# Patient Record
Sex: FEMALE | Race: WHITE
[De-identification: names, ages, dates, MRNs, and addresses within clinical notes are randomized per-mention and may not be internally consistent; named-entity substitution may affect disease eponyms.]

---

## 2018-10-26 ENCOUNTER — HOSPITAL ENCOUNTER (OUTPATIENT)
Dept: HOSPITAL 47 - RADMAMWWP | Age: 48
Discharge: HOME | End: 2018-10-26
Attending: INTERNAL MEDICINE
Payer: COMMERCIAL

## 2018-10-26 DIAGNOSIS — R07.9: ICD-10-CM

## 2018-10-26 DIAGNOSIS — Z12.31: Primary | ICD-10-CM

## 2018-10-26 PROCEDURE — 75571 CT HRT W/O DYE W/CA TEST: CPT

## 2018-10-26 PROCEDURE — 77067 SCR MAMMO BI INCL CAD: CPT

## 2018-10-26 PROCEDURE — 77063 BREAST TOMOSYNTHESIS BI: CPT

## 2018-10-26 NOTE — CT
EXAMINATION TYPE: CT heart w calcium score

 

DATE OF EXAM: 10/26/2018

 

COMPARISON: None

 

HISTORY: Screening for cardiovascular disorder. 213.9

 

CT DLP: 44.8 mGycm

Automated exposure control for dose reduction was used.

 

CT CALCIUM SCORING

Coronary calcium is a marker for plaque (fatty deposits) in a blood vessel or atherosclerosis (harden
ing of the arteries).  The presence and amount of calcium detected in a coronary artery by the CT sca
n, indicates the presence and amount of atherosclerotic plaque.  These calcium deposits appear years 
before the development of heart disease symptoms such as chest pain and shortness of breath.

 

A calcium score is computed for each of the coronary arteries based upon the volume and density of th
e calcium deposits.  This can be referred to as your calcified plaque burden.  It does not correspond
 directly to the percentage of narrowing in the artery but does correlate with the severity of the un
derlying coronary atherosclerosis.

 

PROCEDURE

 

TECHNIQUE - Prospective Gating was used.  Slice thickness: 3mm.

Density threshold (HU): 130, Pixel threshold: 3, Algorithm: discrete.

 

RESULTS

 

Region:  LM

Calcium Score (Agatston): 0

Volume (mm3): 0

Mass (g):

 

Region:  RCA

Calcium Score (Agatston): 0

Volume (mm3): 0

Mass (g):

 

Region:  LAD

Calcium Score (Agatston): 0

Volume (mm3): 0

Mass (g):

 

Region:  CX

Calcium Score (Agatston): 0

Volume (mm3): 0

Mass (g):

 

Total:

Calcium Score (Agatston):  0

Volume (mm3): 0

Mass (g):

 

TOTAL CALCIUM SCORE:  0

 

IMPRESSION: 

 

Calcium Score:  0

 

Implication:  No identifiable plaque.

 

Risk of Coronary Artery Disease: Very low, generally less than 5%.

## 2018-10-29 NOTE — MM
Reason for exam: screening  (asymptomatic).

Last mammogram was performed 2 years and 9 months ago.



History:

Benign US left guided VAD of the left breast, March 21, 2011.

Taking hormonal contraceptives for 7 years 9 months beginning at age 34.



Physical Findings:

A clinical breast exam by your physician is recommended on an annual basis and 

results should be correlated with mammographic findings.



MG 3D Screening Mammo W/Cad

Bilateral CC and MLO view(s) were taken.

Prior study comparison: January 15, 2016, bilateral MG screening mammo w CAD.  

April 14, 2014, CAD bilateral diagnostic mammogram.

The breast tissue is heterogeneously dense. This may lower the sensitivity of 

mammography.  No suspicious calcifications are seen. Previous mammotome biopsy in 

the left breast. There is no discrete abnormality.  No significant changes when 

compared with prior studies.





ASSESSMENT: Benign, BI-RAD 2



RECOMMENDATION:

Routine screening mammogram of both breasts in 1 year.

## 2020-08-22 ENCOUNTER — HOSPITAL ENCOUNTER (OUTPATIENT)
Dept: HOSPITAL 47 - RADUSMAIN | Age: 50
Discharge: HOME | End: 2020-08-22
Attending: PHYSICIAN ASSISTANT
Payer: COMMERCIAL

## 2020-08-22 ENCOUNTER — HOSPITAL ENCOUNTER (OUTPATIENT)
Dept: HOSPITAL 47 - LABMAIN | Age: 50
Discharge: HOME | End: 2020-08-22
Attending: PHYSICIAN ASSISTANT
Payer: COMMERCIAL

## 2020-08-22 DIAGNOSIS — R10.10: Primary | ICD-10-CM

## 2020-08-22 DIAGNOSIS — K82.4: Primary | ICD-10-CM

## 2020-08-22 LAB
ALBUMIN SERPL-MCNC: 3.9 G/DL (ref 3.5–5)
ALBUMIN/GLOB SERPL: 1.3 {RATIO}
ALP SERPL-CCNC: 59 U/L (ref 38–126)
ALT SERPL-CCNC: 16 U/L (ref 4–34)
AMYLASE SERPL-CCNC: 53 U/L (ref 30–110)
ANION GAP SERPL CALC-SCNC: 7 MMOL/L
AST SERPL-CCNC: 25 U/L (ref 14–36)
BASOPHILS # BLD AUTO: 0.1 K/UL (ref 0–0.2)
BASOPHILS NFR BLD AUTO: 1 %
BUN SERPL-SCNC: 10 MG/DL (ref 7–17)
CALCIUM SPEC-MCNC: 8.7 MG/DL (ref 8.4–10.2)
CHLORIDE SERPL-SCNC: 100 MMOL/L (ref 98–107)
CO2 SERPL-SCNC: 29 MMOL/L (ref 22–30)
EOSINOPHIL # BLD AUTO: 0 K/UL (ref 0–0.7)
EOSINOPHIL NFR BLD AUTO: 0 %
ERYTHROCYTE [DISTWIDTH] IN BLOOD BY AUTOMATED COUNT: 4.78 M/UL (ref 3.8–5.4)
ERYTHROCYTE [DISTWIDTH] IN BLOOD: 12.5 % (ref 11.5–15.5)
GLOBULIN SER CALC-MCNC: 2.9 G/DL
GLUCOSE SERPL-MCNC: 96 MG/DL (ref 74–99)
HCT VFR BLD AUTO: 43.9 % (ref 34–46)
HGB BLD-MCNC: 14.9 GM/DL (ref 11.4–16)
LYMPHOCYTES # SPEC AUTO: 1.5 K/UL (ref 1–4.8)
LYMPHOCYTES NFR SPEC AUTO: 20 %
MCH RBC QN AUTO: 31.1 PG (ref 25–35)
MCHC RBC AUTO-ENTMCNC: 33.8 G/DL (ref 31–37)
MCV RBC AUTO: 91.9 FL (ref 80–100)
MONOCYTES # BLD AUTO: 0.3 K/UL (ref 0–1)
MONOCYTES NFR BLD AUTO: 4 %
NEUTROPHILS # BLD AUTO: 5.3 K/UL (ref 1.3–7.7)
NEUTROPHILS NFR BLD AUTO: 73 %
PLATELET # BLD AUTO: 229 K/UL (ref 150–450)
POTASSIUM SERPL-SCNC: 3.6 MMOL/L (ref 3.5–5.1)
PROT SERPL-MCNC: 6.8 G/DL (ref 6.3–8.2)
SODIUM SERPL-SCNC: 136 MMOL/L (ref 137–145)
WBC # BLD AUTO: 7.3 K/UL (ref 3.8–10.6)

## 2020-08-22 PROCEDURE — 36415 COLL VENOUS BLD VENIPUNCTURE: CPT

## 2020-08-22 PROCEDURE — 76700 US EXAM ABDOM COMPLETE: CPT

## 2020-08-22 PROCEDURE — 82150 ASSAY OF AMYLASE: CPT

## 2020-08-22 PROCEDURE — 83690 ASSAY OF LIPASE: CPT

## 2020-08-22 PROCEDURE — 85025 COMPLETE CBC W/AUTO DIFF WBC: CPT

## 2020-08-22 PROCEDURE — 80053 COMPREHEN METABOLIC PANEL: CPT

## 2020-08-22 NOTE — US
EXAMINATION TYPE: US abdomen complete

 

DATE OF EXAM: 8/22/2020

 

COMPARISON: NONE

 

CLINICAL HISTORY: r10.10 ABD PAIN. RUQ and epigastric pain x 2 days, no N/V

 

EXAM MEASUREMENTS:

 

Liver Length:  16.5 cm   

Gallbladder Wall:  0.2 cm   

CBD:  0.4 cm

Spleen:  11.7 cm   

Right Kidney:  11.3 x 4.4 x 6.1 cm 

Left Kidney:  10.9 x 4.2 x 5.8 cm   

 

 

 

Pancreas:  wnl

Liver:  wnl  

Gallbladder:  anterior wall polyp = 0.3cm 

**Evidence for sonographic Zabala's sign:  no

CBD:  wnl 

Spleen:  wnl   

Right Kidney:  wnl   

Left Kidney:  wnl   

Upper IVC:  wnl  

Abd Aorta:  wnl

 

 

 

The liver is homogenous.  The intrahepatic portion of the IVC and proximal abdominal aorta are within
 normal limits.  There is no evidence of cholelithiasis.  Common bile duct is unremarkable.  The visu
alized portions of the pancreas are homogenous.  The spleen is unremarkable.  Kidneys are symmetric a
nd free of hydronephrosis.  No renal lesions are seen.

 

IMPRESSION:

Small gallbladder polyp. Otherwise unremarkable study.

## 2020-11-16 ENCOUNTER — HOSPITAL ENCOUNTER (OUTPATIENT)
Dept: HOSPITAL 47 - RADMAMWWP | Age: 50
Discharge: HOME | End: 2020-11-16
Attending: OBSTETRICS & GYNECOLOGY
Payer: COMMERCIAL

## 2020-11-16 DIAGNOSIS — Z12.31: Primary | ICD-10-CM

## 2020-11-16 PROCEDURE — 77067 SCR MAMMO BI INCL CAD: CPT

## 2020-11-16 PROCEDURE — 77063 BREAST TOMOSYNTHESIS BI: CPT

## 2020-11-18 NOTE — MM
Reason for exam: screening  (asymptomatic).

Last mammogram was performed 2 years and 1 month ago.



History:

Benign US left guided VAD of the left breast, March 21, 2011.

Taking hormonal contraceptives for 7 years 9 months beginning at age 34.



Physical Findings:

A clinical breast exam by your physician is recommended on an annual basis and 

results should be correlated with mammographic findings.



MG 3D Screening Mammo W/Cad

Bilateral CC and MLO view(s) were taken.

Prior study comparison: October 26, 2018, bilateral MG 3d screening mammo w/cad.  

January 15, 2016, bilateral MG screening mammo w CAD.

The breast tissue is heterogeneously dense. This may lower the sensitivity of 

mammography.  Previous mammotome biopsy in the left breast.  No significant 

changes when compared with prior studies.





ASSESSMENT: Benign, BI-RAD 2



RECOMMENDATION:

Routine screening mammogram of both breasts in 1 year.

## 2021-04-23 ENCOUNTER — HOSPITAL ENCOUNTER (OUTPATIENT)
Dept: HOSPITAL 47 - ORWHC2ENDO | Age: 51
Discharge: HOME | End: 2021-04-23
Attending: INTERNAL MEDICINE
Payer: COMMERCIAL

## 2021-04-23 VITALS — RESPIRATION RATE: 18 BRPM | DIASTOLIC BLOOD PRESSURE: 93 MMHG | SYSTOLIC BLOOD PRESSURE: 149 MMHG | HEART RATE: 56 BPM

## 2021-04-23 VITALS — BODY MASS INDEX: 25 KG/M2

## 2021-04-23 VITALS — TEMPERATURE: 97.3 F

## 2021-04-23 DIAGNOSIS — K29.50: ICD-10-CM

## 2021-04-23 DIAGNOSIS — K31.7: ICD-10-CM

## 2021-04-23 DIAGNOSIS — Z79.899: ICD-10-CM

## 2021-04-23 DIAGNOSIS — K52.9: ICD-10-CM

## 2021-04-23 DIAGNOSIS — Z12.11: Primary | ICD-10-CM

## 2021-04-23 DIAGNOSIS — K21.9: ICD-10-CM

## 2021-04-23 PROCEDURE — 43239 EGD BIOPSY SINGLE/MULTIPLE: CPT

## 2021-04-23 PROCEDURE — 81025 URINE PREGNANCY TEST: CPT

## 2021-04-23 PROCEDURE — 88305 TISSUE EXAM BY PATHOLOGIST: CPT

## 2021-04-23 PROCEDURE — 45385 COLONOSCOPY W/LESION REMOVAL: CPT

## 2021-04-23 NOTE — P.PCN
Date of Procedure: 04/23/21


Procedure(s) Performed: 


Brief history:


Patient is a pleasant 51-year-old white female scheduled for an elective upper 

endoscopy as well as colonoscopy as a part of evaluation of long-standing 

history of GERD and screening for colorectal neoplasia.





Procedure performed:


Esophagogastroduodenoscopy with biopsy


Colonoscopy with snare polypectomy





Preoperative diagnosis:


Long-standing history of GERD


Screening for colon cancer





Anesthesia: MAC





Procedure:


After informed consent was obtained from the patient  was brought into the 

endoscopy unit and IV  sedation was administered by anesthesia under continuous 

monitoring.  Initially upper endoscopy was done.  The Olympus  video 

endoscope was inserted inserted into the mouth and esophagus intubated without 

any difficulty and was gradually advanced into the stomach and duodenum and 

carefully examined.  The bulb and second part of the duodenum appeared normal.  

The scope was then withdrawn into the stomach adequately insufflated with air 

and upon careful examination  the antrum had linear areas of erythema which was 

biopsied.  The body the stomach there was small gastric polyps noted which were 

also biopsied.  The mucosa of the  body, cardia and fundus appeared normal.  The

scope was then withdrawn into the esophagus.  The GE junction was located at 38 

cm to the incisors.  It appeared regular with no erythema erosions or 

ulcerations.  Rest of the esophagus appeared normal.  Patient tolerated the 

procedure well.





At this time the patient continued to remain sedation.  Initial digital rectal 

examination was normal.  Olympus  video colonoscope was then inserted into

the rectum and gradually advanced to the cecum without any difficulty.  Careful 

examination was performed as the scope was gradually being withdrawn.  The prep 

was excellent.  The cecum, ascending colon, transverse colon, appeared normal.  

In the descending colon there was a 5-6 mm flat polyp that was removed by snare 

polypectomy.  descending colon, sigmoid colon and rectum appeared normal.  

Retroflexion was performed in the rectum and no lesions were noted.  Patient 

tolerated the procedure well.





Impression:


1.  Upper endoscopy revealed mild antral gastritis and small gastric polyps.  No

evidence of Abdul's esophagus


2.  Colonoscopy revealed 6-7 mm flat descending colon polyp status post 

polypectomy.  Rest of the colon appeared normal.








Recommendations:


Findings of this examination were discussed with the patient as well as her 

family.  She was advised to follow with the biopsy results.  She will continue 

with dexilant 60 mg daily and follow antireflux measures.  She can have a repeat

screening colonoscopy in 5-10 years based on the biopsy results.

## 2021-08-05 ENCOUNTER — HOSPITAL ENCOUNTER (OUTPATIENT)
Dept: HOSPITAL 47 - LABWHC1 | Age: 51
Discharge: HOME | End: 2021-08-05
Attending: FAMILY MEDICINE
Payer: COMMERCIAL

## 2021-08-05 DIAGNOSIS — Z53.9: Primary | ICD-10-CM

## 2022-03-16 ENCOUNTER — HOSPITAL ENCOUNTER (OUTPATIENT)
Dept: HOSPITAL 47 - RADUSWWP | Age: 52
Discharge: HOME | End: 2022-03-16
Attending: FAMILY MEDICINE
Payer: COMMERCIAL

## 2022-03-16 DIAGNOSIS — M79.661: Primary | ICD-10-CM

## 2022-03-16 NOTE — US
EXAMINATION TYPE: US venous doppler duplex LE RT

 

DATE OF EXAM: 3/16/2022 7:52 AM

 

COMPARISON: NONE

 

CLINICAL HISTORY: M79.661 Pain in Rt Lower Leg. Pt states pain right calf, no prev DVT, not on blood 
thinners

 

SIDE PERFORMED: Right  

 

TECHNIQUE:  The lower extremity deep venous system is examined utilizing real time linear array sonog
nakita with graded compression, doppler sonography and color-flow sonography.

 

VESSELS IMAGED:

Common Femoral Vein

Deep Femoral Vein

Greater Saphenous Vein *

Femoral Vein

Popliteal Vein

Small Saphenous Vein *

Proximal Calf Veins

(* superficial vessels)

 

 

 

Right Leg:  Negative for DVT

 

 

 

 

IMPRESSION: No evidence for DVT at this time.

## 2022-04-29 ENCOUNTER — HOSPITAL ENCOUNTER (OUTPATIENT)
Dept: HOSPITAL 47 - RADMAMWWP | Age: 52
Discharge: HOME | End: 2022-04-29
Attending: FAMILY MEDICINE
Payer: COMMERCIAL

## 2022-04-29 DIAGNOSIS — Z12.31: Primary | ICD-10-CM

## 2022-04-29 PROCEDURE — 77067 SCR MAMMO BI INCL CAD: CPT

## 2022-04-29 PROCEDURE — 77063 BREAST TOMOSYNTHESIS BI: CPT

## 2022-05-02 NOTE — MM
Reason for exam: screening  (asymptomatic).

Last mammogram was performed 1 year and 5 months ago.



History:

Benign US left guided VAD of the left breast, March 21, 2011.

Taking hormonal contraceptives for 7 years 9 months beginning at age 34.



Physical Findings:

A clinical breast exam by your physician is recommended on an annual basis and 

results should be correlated with mammographic findings.



MG 3D Screening Mammo W/Cad

Bilateral CC and MLO view(s) were taken.

Prior study comparison: November 16, 2020, bilateral MG 3d screening mammo w/cad. 

October 26, 2018, bilateral MG 3d screening mammo w/cad.

The breast tissue is heterogeneously dense. This may lower the sensitivity of 

mammography.  Previous mammotome biopsy in the left breast.  No significant 

changes when compared with prior studies.





ASSESSMENT: Benign, BI-RAD 2



RECOMMENDATION:

Routine screening mammogram of both breasts in 1 year.

## 2023-05-01 ENCOUNTER — HOSPITAL ENCOUNTER (OUTPATIENT)
Dept: HOSPITAL 47 - RADMAMWWP | Age: 53
Discharge: HOME | End: 2023-05-01
Attending: FAMILY MEDICINE
Payer: COMMERCIAL

## 2023-05-01 DIAGNOSIS — Z12.31: Primary | ICD-10-CM

## 2023-05-01 PROCEDURE — 77067 SCR MAMMO BI INCL CAD: CPT

## 2023-05-01 PROCEDURE — 77063 BREAST TOMOSYNTHESIS BI: CPT

## 2023-05-02 NOTE — MM
Reason for Exam: Screening  (asymptomatic). 

Last mammogram was performed 1 year(s) and 1 month(s) ago. 





Patient History: 

Menarche at age 14. First Full-Term Pregnancy at age 30. Patient has history of breast feeding.

Previous chemotherapy. Currently using Hormonal Contraceptives, beginning at age 34 for 7 years, 9

months. 3/21/2011, Benign Core Biopsy on the left side.

Last menstrual period: 01/16/2023





Risk Values: 

Sofia 5 year model risk: 1.4%.

NCI Lifetime model risk: 11.2%.





Prior Study Comparison: 

10/26/2018 Bilateral Screening Mammogram, [facility]. 11/16/2020 Bilateral Screening Mammogram, [facility].

4/29/2022 Bilateral Screening Mammogram, [facility]. 





Tissue Density: 

The breast tissue is heterogeneously dense. This may lower the sensitivity of mammography.





Findings: 

Analyzed By CAD. 

1.3 cm nodule right axillary tail not seen previously, possible axillary lymph node now pulled into

the field-of-view. Further evaluation is recommended. Microclip left breast from prior biopsy.

Otherwise, no significant change. 





Overall Assessment: Incomplete: need additional imaging evaluation, BI-RAD 0





Management: 

Special View Mammogram of the right breast.

Diagnostic Breast Ultrasound of the right breast.

Additional views to include spot 3-D MLO and 3-D XCCL. Also, entire right breast ultrasound.

Particular attention to the axillary tail region.



[facility] will attempt to contact patient to return for supplemental views and

ultrasound if indicated.



Electronically signed and approved by: Tejinder Lboo M.D. Radiologist

## 2023-05-12 ENCOUNTER — HOSPITAL ENCOUNTER (OUTPATIENT)
Dept: HOSPITAL 47 - RADMAMWWP | Age: 53
Discharge: HOME | End: 2023-05-12
Attending: FAMILY MEDICINE
Payer: COMMERCIAL

## 2023-05-12 DIAGNOSIS — R92.8: Primary | ICD-10-CM

## 2023-05-12 PROCEDURE — 77061 BREAST TOMOSYNTHESIS UNI: CPT

## 2023-05-12 PROCEDURE — 77065 DX MAMMO INCL CAD UNI: CPT

## 2023-05-12 NOTE — USB
Reason for Exam: Additional evaluation requested from abnormal screening. 





Patient History: 

Menarche at age 14. First Full-Term Pregnancy at age 30. Patient has history of breast feeding.

Previous chemotherapy. Currently using Hormonal Contraceptives, beginning at age 34 for 7 years, 9

months. 3/21/2011, Benign Core Biopsy on the left side. 





Risk Values: 

Sofia 5 year model risk: 1.4%.

NCI Lifetime model risk: 11.2%.

Technique: 

Method: Whole Breast Handheld.  





Prior Study Comparison: 

11/16/2020 Bilateral Screening Mammogram, [facility]. 4/29/2022 Bilateral Screening Mammogram, [facility].

5/1/2023 Bilateral MG 3D screening mammo w/cad, [facility]. 





Findings: 

The whole breast of the right breast, the axilla of the right breast and the retroareolar of the

right breast were scanned.

A complete US of all four quadrants of the breast and retro-areolar region were reviewed. Additional

evaluation of the right axilla. There is a solid heterogenous hypoechoic mass within the right

axilla measuring 1.1 x 1.2 x 1.1 cm corresponding to mammogram. There is some peripheral color flow.

It is parallel in orientation with microlobulated margin and no posterior acoustic features. 





Overall Assessment: Suspicious, BI-RAD 4





Management: 

Ultrasound Core Biopsy of the right breast.

A clinical breast exam by your physician is recommended on an annual basis and results should be

correlated with mammographic findings.  This exam should not preclude additional follow-up of

suspicious palpable abnormalities.  Results were given to the patient verbally at the time of exam.



Electronically signed and approved by: Hans Webb D.O.

## 2023-05-12 NOTE — MM
Reason for Exam: Additional evaluation requested from abnormal screening. 

Last screening mammogram was performed less than 1 month ago.





Patient History: 

Menarche at age 14. First Full-Term Pregnancy at age 30. Patient has history of breast feeding.

Previous chemotherapy. Currently using Hormonal Contraceptives, beginning at age 34 for 7 years, 9

months. 3/21/2011, Benign Core Biopsy on the left side. 





Risk Values: 

Sofia 5 year model risk: 1.4%.

NCI Lifetime model risk: 11.2%.





Tissue Density: 

Right: The breast tissue is heterogeneously dense. This may lower the sensitivity of mammography.





Findings: 

Analyzed By CAD. 

No evidence architectural distortion. No suspicious group of microcalcifications within the right

breast. Persistent 1.3 cm equal density asymmetry within the upper right breast on the MLO view. 





Overall Assessment: Incomplete: need additional imaging evaluation, BI-RAD 0





Management: 

Diagnostic Breast Ultrasound of the right breast.

A clinical breast exam by your physician is recommended on an annual basis and results should be

correlated with mammographic findings.  This exam should not preclude additional follow-up of

suspicious palpable abnormalities.  Results were given to the patient verbally at the time of exam.



Electronically signed and approved by: Hans Webb D.O.

## 2023-05-16 ENCOUNTER — HOSPITAL ENCOUNTER (OUTPATIENT)
Dept: HOSPITAL 47 - RADUSWWP | Age: 53
End: 2023-05-16
Attending: FAMILY MEDICINE
Payer: COMMERCIAL

## 2023-05-16 DIAGNOSIS — Z17.1: ICD-10-CM

## 2023-05-16 DIAGNOSIS — C50.911: Primary | ICD-10-CM

## 2023-05-16 PROCEDURE — 88342 IMHCHEM/IMCYTCHM 1ST ANTB: CPT

## 2023-05-16 PROCEDURE — 88305 TISSUE EXAM BY PATHOLOGIST: CPT

## 2023-05-16 PROCEDURE — 77065 DX MAMMO INCL CAD UNI: CPT

## 2023-05-16 PROCEDURE — 19084 BX BREAST ADD LESION US IMAG: CPT

## 2023-05-16 PROCEDURE — 19083 BX BREAST 1ST LESION US IMAG: CPT

## 2023-05-16 PROCEDURE — 88341 IMHCHEM/IMCYTCHM EA ADD ANTB: CPT

## 2023-05-22 NOTE — MM
Reason for Exam: Post Procedure Mammogram. 

Last screening mammogram was performed less than 1 month ago.





Patient History: 

Menarche at age 14. First Full-Term Pregnancy at age 30. Patient has history of breast feeding.

Currently using Hormonal Contraceptives, beginning at age 34 for 7 years, 9 months. 3/21/2011,

Benign Core Biopsy on the left side. 





Risk Values: 

Sofia 5 year model risk: 1.4%.

NCI Lifetime model risk: 11.2%.





Prior Study Comparison: 

4/29/2022 Bilateral Screening Mammogram, [facility]. 5/1/2023 Bilateral MG 3D screening mammo w/cad, [facility].

5/12/2023 Right MG 3D work up w/cad RT, [facility]. 





Tissue Density: 

Right: The breast tissue is heterogeneously dense. This may lower the sensitivity of mammography.

Pathology Description: 

Location: axilla.

Marker Left Behind.

Cores: 6 Gauge: 13

The procedure of ultrasound guided core biopsy was explained to the patient.  Benefits,

alternatives, and risks were discussed.  An informed consent was then obtained.



The right axillary mass is identified and targeted for biopsy.



The patient was placed in supine positioning for imaging and for the procedure. The overlying skin

was prepped and draped in usual sterile fashion.  Lidocaine was used as anesthetic into the skin

followed by lidocaine/epinephrine into the subcutaneous tissue up to area of concern in the right

breast.



Under ultrasound guidance, a 13-gauge vacuum-assisted mammotome Elite biopsy gun was used to obtain

6 core samples.  Following this, a Hydromark butterfly clip was left in lesion.



The patient tolerated the procedure well without any immediate complication.  The patient was kept

in the radiology department for short stay after the procedure and then discharged home in stable

condition.



Postprocedure mammogram: The patient was transferred to mammography for physician ordered post

procedure mammogram for clip placement verification.



Post procedure mammogram shows clip in satisfactory position far posterior upper-outer quadrant, in

the region of the mammographic correlate.





IMPRESSION:

Successful, uncomplicated ultrasound guided core biopsy of area of concern near the right axilla/far

posterior upper outer quadrant right breast; full pathology results to follow. 





Pathology Results: 

Result: Malignant, Invasive ductal carcinoma.

RIGHT BREAST AXILLA, ULTRASOUND GUIDED NEEDLE CORE BIOPSY:  Invasive poorly differentiated ductal

carcinoma (Grade 3).  See Surgical Pathology Cancer Case Summary and Comment.



Notes



SURGICAL PATHOLOGY CANCER CASE SUMMARY - INVASIVE CARCINOMA OF THE BREAST, BIOPSY:



PROCEDURE: Ultrasound guided needle core biopsy.

SPECIMEN LATERALITY: Right.

TUMOR SITE:  Axilla.

HISTOLOGIC TYPE OF INVASIVE CARCINOMA: Invasive carcinoma of no special type (ductal).

HISTOLOGIC GRADE (NAVDEEP HISTOLOGIC SCORE):

GLANDULAR (ACINAR)/TUBULAR DIFFERENTIATION: Score 3.

NUCLEAR PLEOMORPHISM: Score 2.

MITOTIC RATE: Score 3.

OVERALL GRADE: Grade 3 (total score 8).

TUMOR SIZE:  Greatest dimension of invasive carcinoma measures 6 mm.

DUCTAL CARCINOMA IN SITU (DCIS): Focally present.

NUCLEAR GRADE: Grade 3.





COMMENT:  Ki-67 immunoperoxidase stain performed and evaluated with appropriate positive control

shows a tumor proliferative index of 51-60%.  It is noted that focal background benign breast tissue

is identified. Given the presence of benign breast tissue and focal DCIS, as well as lack of any

identifiable lymph node tissue, the tumor is favored to represent a primary malignancy arising in

axillary breast tissue rather than a replaced lymph node. Clinical and imaging study correlation is

recommended.



Representative material from this case was also reviewed by Dr. Osavldo Covarrubias, who agrees with the

diagnosis of malignancy.



ANCILLARY STUDIES:



ESTROGEN RECEPTOR (ER):  Negative (less than 1% staining).  Internal control cells are present and

stain as expected.



PROGESTERONE RECEPTOR (PA):   Negative (less than 1% staining).  Internal control cells are present

and stain as expected.



HER2 BY IHC:   Negative (score 1+).



Additional formalin-fixed paraffin embedded sections from block A1 are immunostained at [place], [place] for the demonstration of estrogen and progesterone receptors, using

rabbit IgG monoclonal antibodies ER clone [id number] and PA clone [id number] with iView DAB detection ([place],

[place], [place]).  Using this method, and according to ASCO-CAP guidelines, a value of >1% nuclear

staining is a positive result.



Additional sections from block A1 are immunostained for the evaluation of cell membrane antigens

associated with the expression of the Her-2 gene product using the Pathway anti-HER-2/Giovanna rabbit

monoclonal primary antibody [id number] with ultraview DAB detection ([facility], [place], [place]), the staining

validated and performed at [place], [place], and including appropriate positive

and negative controls. With this method, staining results are reported as follows: 0 is no staining

of tumor cells or incomplete faint/barely perceptible membrane staining in less than 10% of tumor

cells; 1+ is incomplete faint/barely perceptible membranes staining in greater than 10% of tumor

cells; 2+ is weak/moderate complete membrane staining in greater than 10% of tumor cells; 3+ is

circumferential complete membrane staining that is intense and present in greater than 10% of tumor

cells.  Results are interpreted as follows:  0,1+ negative; 2+ equivocal; 3+ positive.  Current

CAP/ASCO guidelines recommend further evaluation of equivocal immunostaining results by FISH testing

to assess Her-2 overexpression at the gene level.



Current CAP/ASCO guidelines recommend repeat testing of negative core biopsy immunostain results on

additional specimens if the tumor is Grade 3, the amount of tumor assessed in the core material is

small, the subsequent specimen contains high grade tumor morphologically distinct from that seen in

the core, the core material is equivocal by immunostain and FISH, there is doubt about the

appropriateness of specimen handling or fixation, or the pathologist suspects a negative result on

the basis of test error. 





Overall Assessment: Malignant





Assessment: MG diagnostic mammo RT wo CAD - Right: Known biopsy proven malignancy, BI-RAD 6. 





Management: 

Surgical Consultation of the right breast.

Electronically signed and approved by: Tejinder Lobo M.D. Radiologist